# Patient Record
Sex: MALE | Race: WHITE | NOT HISPANIC OR LATINO | Employment: OTHER | URBAN - METROPOLITAN AREA
[De-identification: names, ages, dates, MRNs, and addresses within clinical notes are randomized per-mention and may not be internally consistent; named-entity substitution may affect disease eponyms.]

---

## 2023-05-18 ENCOUNTER — APPOINTMENT (OUTPATIENT)
Dept: LAB | Facility: LAB | Age: 78
End: 2023-05-18

## 2023-05-18 LAB
ALANINE AMINOTRANSFERASE (SGPT) (U/L) IN SER/PLAS: 16 U/L (ref 10–52)
ALBUMIN (G/DL) IN SER/PLAS: 4.3 G/DL (ref 3.4–5)
ALKALINE PHOSPHATASE (U/L) IN SER/PLAS: 91 U/L (ref 33–136)
ANION GAP IN SER/PLAS: 13 MMOL/L (ref 10–20)
ASPARTATE AMINOTRANSFERASE (SGOT) (U/L) IN SER/PLAS: 17 U/L (ref 9–39)
BASOPHILS (10*3/UL) IN BLOOD BY AUTOMATED COUNT: 0.04 X10E9/L (ref 0–0.1)
BASOPHILS/100 LEUKOCYTES IN BLOOD BY AUTOMATED COUNT: 0.6 % (ref 0–2)
BILIRUBIN TOTAL (MG/DL) IN SER/PLAS: 0.3 MG/DL (ref 0–1.2)
C REACTIVE PROTEIN (MG/L) IN SER/PLAS: 0.49 MG/DL
CALCIUM (MG/DL) IN SER/PLAS: 9.7 MG/DL (ref 8.6–10.6)
CARBON DIOXIDE, TOTAL (MMOL/L) IN SER/PLAS: 29 MMOL/L (ref 21–32)
CHLORIDE (MMOL/L) IN SER/PLAS: 106 MMOL/L (ref 98–107)
CREATININE (MG/DL) IN SER/PLAS: 0.86 MG/DL (ref 0.5–1.3)
EOSINOPHILS (10*3/UL) IN BLOOD BY AUTOMATED COUNT: 0.11 X10E9/L (ref 0–0.4)
EOSINOPHILS/100 LEUKOCYTES IN BLOOD BY AUTOMATED COUNT: 1.6 % (ref 0–6)
ERYTHROCYTE DISTRIBUTION WIDTH (RATIO) BY AUTOMATED COUNT: 15 % (ref 11.5–14.5)
ERYTHROCYTE MEAN CORPUSCULAR HEMOGLOBIN CONCENTRATION (G/DL) BY AUTOMATED: 31.3 G/DL (ref 32–36)
ERYTHROCYTE MEAN CORPUSCULAR VOLUME (FL) BY AUTOMATED COUNT: 86 FL (ref 80–100)
ERYTHROCYTES (10*6/UL) IN BLOOD BY AUTOMATED COUNT: 4.52 X10E12/L (ref 4.5–5.9)
GFR MALE: 88 ML/MIN/1.73M2
GLUCOSE (MG/DL) IN SER/PLAS: 89 MG/DL (ref 74–99)
HEMATOCRIT (%) IN BLOOD BY AUTOMATED COUNT: 39 % (ref 41–52)
HEMOGLOBIN (G/DL) IN BLOOD: 12.2 G/DL (ref 13.5–17.5)
IMMATURE GRANULOCYTES/100 LEUKOCYTES IN BLOOD BY AUTOMATED COUNT: 0.1 % (ref 0–0.9)
LEUKOCYTES (10*3/UL) IN BLOOD BY AUTOMATED COUNT: 6.9 X10E9/L (ref 4.4–11.3)
LYMPHOCYTES (10*3/UL) IN BLOOD BY AUTOMATED COUNT: 3.68 X10E9/L (ref 0.8–3)
LYMPHOCYTES/100 LEUKOCYTES IN BLOOD BY AUTOMATED COUNT: 53.1 % (ref 13–44)
MONOCYTES (10*3/UL) IN BLOOD BY AUTOMATED COUNT: 0.52 X10E9/L (ref 0.05–0.8)
MONOCYTES/100 LEUKOCYTES IN BLOOD BY AUTOMATED COUNT: 7.5 % (ref 2–10)
NEUTROPHILS (10*3/UL) IN BLOOD BY AUTOMATED COUNT: 2.57 X10E9/L (ref 1.6–5.5)
NEUTROPHILS/100 LEUKOCYTES IN BLOOD BY AUTOMATED COUNT: 37.1 % (ref 40–80)
NRBC (PER 100 WBCS) BY AUTOMATED COUNT: 0 /100 WBC (ref 0–0)
PLATELETS (10*3/UL) IN BLOOD AUTOMATED COUNT: 237 X10E9/L (ref 150–450)
POTASSIUM (MMOL/L) IN SER/PLAS: 5 MMOL/L (ref 3.5–5.3)
PROTEIN TOTAL: 6.5 G/DL (ref 6.4–8.2)
SEDIMENTATION RATE, ERYTHROCYTE: 16 MM/H (ref 0–20)
SODIUM (MMOL/L) IN SER/PLAS: 143 MMOL/L (ref 136–145)
UREA NITROGEN (MG/DL) IN SER/PLAS: 21 MG/DL (ref 6–23)

## 2023-11-20 ENCOUNTER — APPOINTMENT (OUTPATIENT)
Dept: OTOLARYNGOLOGY | Facility: CLINIC | Age: 78
End: 2023-11-20
Payer: MEDICARE

## 2023-11-27 ENCOUNTER — ANESTHESIA EVENT (OUTPATIENT)
Dept: OPERATING ROOM | Age: 78
End: 2023-11-27
Payer: MEDICARE

## 2023-11-27 ENCOUNTER — HOSPITAL ENCOUNTER (OUTPATIENT)
Age: 78
Setting detail: OUTPATIENT SURGERY
Discharge: HOME | End: 2023-11-27
Attending: UROLOGY | Admitting: UROLOGY
Payer: MEDICARE

## 2023-11-27 ENCOUNTER — ANESTHESIA (OUTPATIENT)
Dept: OPERATING ROOM | Age: 78
End: 2023-11-27
Payer: MEDICARE

## 2023-11-27 VITALS
HEIGHT: 64 IN | TEMPERATURE: 97.7 F | DIASTOLIC BLOOD PRESSURE: 61 MMHG | RESPIRATION RATE: 13 BRPM | BODY MASS INDEX: 33.63 KG/M2 | OXYGEN SATURATION: 98 % | HEART RATE: 86 BPM | SYSTOLIC BLOOD PRESSURE: 114 MMHG | WEIGHT: 197 LBS

## 2023-11-27 DIAGNOSIS — G89.18 POST-OPERATIVE PAIN: Primary | ICD-10-CM

## 2023-11-27 PROCEDURE — 3700000001 HC ADD 15 MINUTES (ANESTHESIA): Performed by: UROLOGY

## 2023-11-27 PROCEDURE — 2500000003 HC RX 250 WO HCPCS: Performed by: UROLOGY

## 2023-11-27 PROCEDURE — 7100000011 HC PHASE II RECOVERY - ADDTL 15 MIN: Performed by: UROLOGY

## 2023-11-27 PROCEDURE — 6360000002 HC RX W HCPCS: Performed by: ANESTHESIOLOGY

## 2023-11-27 PROCEDURE — 3700000000 HC ANESTHESIA ATTENDED CARE: Performed by: UROLOGY

## 2023-11-27 PROCEDURE — 2709999900 HC NON-CHARGEABLE SUPPLY: Performed by: UROLOGY

## 2023-11-27 PROCEDURE — 6360000002 HC RX W HCPCS: Performed by: NURSE ANESTHETIST, CERTIFIED REGISTERED

## 2023-11-27 PROCEDURE — 6370000000 HC RX 637 (ALT 250 FOR IP): Performed by: ANESTHESIOLOGY

## 2023-11-27 PROCEDURE — C1713 ANCHOR/SCREW BN/BN,TIS/BN: HCPCS | Performed by: UROLOGY

## 2023-11-27 PROCEDURE — 6360000002 HC RX W HCPCS: Performed by: UROLOGY

## 2023-11-27 PROCEDURE — 7100000001 HC PACU RECOVERY - ADDTL 15 MIN: Performed by: UROLOGY

## 2023-11-27 PROCEDURE — 3600000012 HC SURGERY LEVEL 2 ADDTL 15MIN: Performed by: UROLOGY

## 2023-11-27 PROCEDURE — 3600000002 HC SURGERY LEVEL 2 BASE: Performed by: UROLOGY

## 2023-11-27 PROCEDURE — 7100000010 HC PHASE II RECOVERY - FIRST 15 MIN: Performed by: UROLOGY

## 2023-11-27 PROCEDURE — 2500000003 HC RX 250 WO HCPCS: Performed by: NURSE ANESTHETIST, CERTIFIED REGISTERED

## 2023-11-27 PROCEDURE — 2580000003 HC RX 258: Performed by: ANESTHESIOLOGY

## 2023-11-27 PROCEDURE — 7100000000 HC PACU RECOVERY - FIRST 15 MIN: Performed by: UROLOGY

## 2023-11-27 RX ORDER — ST. JOHN'S WORT 300 MG
1 CAPSULE ORAL DAILY
COMMUNITY

## 2023-11-27 RX ORDER — FERROUS SULFATE 325(65) MG
325 TABLET ORAL DAILY
COMMUNITY

## 2023-11-27 RX ORDER — SUCCINYLCHOLINE/SOD CL,ISO/PF 100 MG/5ML
SYRINGE (ML) INTRAVENOUS PRN
Status: DISCONTINUED | OUTPATIENT
Start: 2023-11-27 | End: 2023-11-27 | Stop reason: SDUPTHER

## 2023-11-27 RX ORDER — FLUCONAZOLE 200 MG/1
200 TABLET ORAL DAILY
COMMUNITY
Start: 2023-06-02

## 2023-11-27 RX ORDER — TAMSULOSIN HYDROCHLORIDE 0.4 MG/1
0.4 CAPSULE ORAL 2 TIMES DAILY
COMMUNITY
Start: 2021-10-28

## 2023-11-27 RX ORDER — SODIUM CHLORIDE 0.9 % (FLUSH) 0.9 %
5-40 SYRINGE (ML) INJECTION EVERY 12 HOURS SCHEDULED
Status: DISCONTINUED | OUTPATIENT
Start: 2023-11-27 | End: 2023-11-27 | Stop reason: HOSPADM

## 2023-11-27 RX ORDER — HYDROMORPHONE HYDROCHLORIDE 1 MG/ML
0.5 INJECTION, SOLUTION INTRAMUSCULAR; INTRAVENOUS; SUBCUTANEOUS EVERY 5 MIN PRN
Status: DISCONTINUED | OUTPATIENT
Start: 2023-11-27 | End: 2023-11-27 | Stop reason: HOSPADM

## 2023-11-27 RX ORDER — PHENYLEPHRINE HCL IN 0.9% NACL 1 MG/10 ML
SYRINGE (ML) INTRAVENOUS PRN
Status: DISCONTINUED | OUTPATIENT
Start: 2023-11-27 | End: 2023-11-27 | Stop reason: SDUPTHER

## 2023-11-27 RX ORDER — DEXAMETHASONE SODIUM PHOSPHATE 10 MG/ML
INJECTION, SOLUTION INTRAMUSCULAR; INTRAVENOUS PRN
Status: DISCONTINUED | OUTPATIENT
Start: 2023-11-27 | End: 2023-11-27 | Stop reason: SDUPTHER

## 2023-11-27 RX ORDER — ROCURONIUM BROMIDE 10 MG/ML
INJECTION, SOLUTION INTRAVENOUS PRN
Status: DISCONTINUED | OUTPATIENT
Start: 2023-11-27 | End: 2023-11-27 | Stop reason: SDUPTHER

## 2023-11-27 RX ORDER — HYDROCODONE BITARTRATE AND ACETAMINOPHEN 5; 325 MG/1; MG/1
1 TABLET ORAL EVERY 6 HOURS PRN
Qty: 20 TABLET | Refills: 0 | Status: SHIPPED | OUTPATIENT
Start: 2023-11-27 | End: 2023-12-04

## 2023-11-27 RX ORDER — SODIUM CHLORIDE 9 MG/ML
INJECTION, SOLUTION INTRAVENOUS PRN
Status: DISCONTINUED | OUTPATIENT
Start: 2023-11-27 | End: 2023-11-27 | Stop reason: HOSPADM

## 2023-11-27 RX ORDER — FENTANYL CITRATE 50 UG/ML
25 INJECTION, SOLUTION INTRAMUSCULAR; INTRAVENOUS EVERY 5 MIN PRN
Status: DISCONTINUED | OUTPATIENT
Start: 2023-11-27 | End: 2023-11-27 | Stop reason: HOSPADM

## 2023-11-27 RX ORDER — DOXYCYCLINE HYCLATE 100 MG
100 TABLET ORAL 2 TIMES DAILY
Qty: 60 TABLET | Refills: 0 | Status: SHIPPED | OUTPATIENT
Start: 2023-11-27 | End: 2023-12-27

## 2023-11-27 RX ORDER — LIDOCAINE HYDROCHLORIDE 20 MG/ML
INJECTION, SOLUTION EPIDURAL; INFILTRATION; INTRACAUDAL; PERINEURAL PRN
Status: DISCONTINUED | OUTPATIENT
Start: 2023-11-27 | End: 2023-11-27 | Stop reason: SDUPTHER

## 2023-11-27 RX ORDER — ONDANSETRON 2 MG/ML
INJECTION INTRAMUSCULAR; INTRAVENOUS PRN
Status: DISCONTINUED | OUTPATIENT
Start: 2023-11-27 | End: 2023-11-27 | Stop reason: SDUPTHER

## 2023-11-27 RX ORDER — GLYCOPYRROLATE 0.2 MG/ML
INJECTION INTRAMUSCULAR; INTRAVENOUS PRN
Status: DISCONTINUED | OUTPATIENT
Start: 2023-11-27 | End: 2023-11-27 | Stop reason: SDUPTHER

## 2023-11-27 RX ORDER — LIDOCAINE HYDROCHLORIDE 10 MG/ML
1 INJECTION, SOLUTION EPIDURAL; INFILTRATION; INTRACAUDAL; PERINEURAL
Status: DISCONTINUED | OUTPATIENT
Start: 2023-11-27 | End: 2023-11-27 | Stop reason: HOSPADM

## 2023-11-27 RX ORDER — SODIUM CHLORIDE 0.9 % (FLUSH) 0.9 %
5-40 SYRINGE (ML) INJECTION PRN
Status: DISCONTINUED | OUTPATIENT
Start: 2023-11-27 | End: 2023-11-27 | Stop reason: HOSPADM

## 2023-11-27 RX ORDER — RIVAROXABAN 20 MG/1
20 TABLET, FILM COATED ORAL
COMMUNITY
Start: 2021-10-25

## 2023-11-27 RX ORDER — SODIUM CHLORIDE 9 MG/ML
INJECTION, SOLUTION INTRAVENOUS CONTINUOUS
Status: DISCONTINUED | OUTPATIENT
Start: 2023-11-27 | End: 2023-11-27 | Stop reason: HOSPADM

## 2023-11-27 RX ORDER — DONEPEZIL HYDROCHLORIDE 5 MG/1
5 TABLET, FILM COATED ORAL NIGHTLY
COMMUNITY
Start: 2023-08-28

## 2023-11-27 RX ORDER — POTASSIUM CHLORIDE 750 MG/1
20 CAPSULE, EXTENDED RELEASE ORAL DAILY
COMMUNITY
Start: 2023-06-01

## 2023-11-27 RX ORDER — DOXYCYCLINE 100 MG/1
100 CAPSULE ORAL EVERY 12 HOURS
COMMUNITY
Start: 2023-06-02

## 2023-11-27 RX ORDER — SODIUM CHLORIDE, SODIUM LACTATE, POTASSIUM CHLORIDE, CALCIUM CHLORIDE 600; 310; 30; 20 MG/100ML; MG/100ML; MG/100ML; MG/100ML
INJECTION, SOLUTION INTRAVENOUS CONTINUOUS
Status: DISCONTINUED | OUTPATIENT
Start: 2023-11-27 | End: 2023-11-27 | Stop reason: HOSPADM

## 2023-11-27 RX ORDER — MEMANTINE HYDROCHLORIDE 28 MG/1
28 CAPSULE, EXTENDED RELEASE ORAL DAILY
COMMUNITY
Start: 2019-09-17

## 2023-11-27 RX ORDER — ONDANSETRON 2 MG/ML
4 INJECTION INTRAMUSCULAR; INTRAVENOUS
Status: DISCONTINUED | OUTPATIENT
Start: 2023-11-27 | End: 2023-11-27 | Stop reason: HOSPADM

## 2023-11-27 RX ORDER — HYDROCODONE BITARTRATE AND ACETAMINOPHEN 5; 325 MG/1; MG/1
1 TABLET ORAL ONCE
Status: COMPLETED | OUTPATIENT
Start: 2023-11-27 | End: 2023-11-27

## 2023-11-27 RX ORDER — EPHEDRINE SULFATE/0.9% NACL/PF 50 MG/5 ML
SYRINGE (ML) INTRAVENOUS PRN
Status: DISCONTINUED | OUTPATIENT
Start: 2023-11-27 | End: 2023-11-27 | Stop reason: SDUPTHER

## 2023-11-27 RX ORDER — PROPOFOL 10 MG/ML
INJECTION, EMULSION INTRAVENOUS PRN
Status: DISCONTINUED | OUTPATIENT
Start: 2023-11-27 | End: 2023-11-27 | Stop reason: SDUPTHER

## 2023-11-27 RX ORDER — FENTANYL CITRATE 50 UG/ML
INJECTION, SOLUTION INTRAMUSCULAR; INTRAVENOUS PRN
Status: DISCONTINUED | OUTPATIENT
Start: 2023-11-27 | End: 2023-11-27 | Stop reason: SDUPTHER

## 2023-11-27 RX ADMIN — Medication 100 MCG: at 12:15

## 2023-11-27 RX ADMIN — Medication 10 MG: at 12:47

## 2023-11-27 RX ADMIN — HYDROMORPHONE HYDROCHLORIDE 0.5 MG: 1 INJECTION, SOLUTION INTRAMUSCULAR; INTRAVENOUS; SUBCUTANEOUS at 14:38

## 2023-11-27 RX ADMIN — SODIUM CHLORIDE, POTASSIUM CHLORIDE, SODIUM LACTATE AND CALCIUM CHLORIDE: 600; 310; 30; 20 INJECTION, SOLUTION INTRAVENOUS at 13:19

## 2023-11-27 RX ADMIN — ROCURONIUM BROMIDE 30 MG: 10 INJECTION, SOLUTION INTRAVENOUS at 12:25

## 2023-11-27 RX ADMIN — Medication 10 MG: at 12:17

## 2023-11-27 RX ADMIN — ONDANSETRON 4 MG: 2 INJECTION INTRAMUSCULAR; INTRAVENOUS at 12:14

## 2023-11-27 RX ADMIN — Medication 100 MCG: at 12:49

## 2023-11-27 RX ADMIN — Medication 100 MCG: at 12:22

## 2023-11-27 RX ADMIN — Medication 100 MCG: at 12:43

## 2023-11-27 RX ADMIN — SUGAMMADEX 179 MG: 100 INJECTION, SOLUTION INTRAVENOUS at 13:15

## 2023-11-27 RX ADMIN — Medication 100 MCG: at 13:13

## 2023-11-27 RX ADMIN — PROPOFOL 120 MG: 10 INJECTION, EMULSION INTRAVENOUS at 12:02

## 2023-11-27 RX ADMIN — HYDROMORPHONE HYDROCHLORIDE 0.5 MG: 1 INJECTION, SOLUTION INTRAMUSCULAR; INTRAVENOUS; SUBCUTANEOUS at 14:45

## 2023-11-27 RX ADMIN — HYDROCODONE BITARTRATE AND ACETAMINOPHEN 1 TABLET: 5; 325 TABLET ORAL at 15:27

## 2023-11-27 RX ADMIN — Medication 10 MG: at 12:24

## 2023-11-27 RX ADMIN — ROCURONIUM BROMIDE 10 MG: 10 INJECTION, SOLUTION INTRAVENOUS at 12:02

## 2023-11-27 RX ADMIN — Medication 2000 MG: at 12:13

## 2023-11-27 RX ADMIN — FENTANYL CITRATE 50 MCG: 50 INJECTION INTRAMUSCULAR; INTRAVENOUS at 12:54

## 2023-11-27 RX ADMIN — DEXAMETHASONE SODIUM PHOSPHATE 10 MG: 10 INJECTION, SOLUTION INTRAMUSCULAR; INTRAVENOUS at 12:13

## 2023-11-27 RX ADMIN — GLYCOPYRROLATE 0.2 MG: 0.2 INJECTION INTRAMUSCULAR; INTRAVENOUS at 13:03

## 2023-11-27 RX ADMIN — Medication 100 MCG: at 13:03

## 2023-11-27 RX ADMIN — Medication 100 MG: at 12:03

## 2023-11-27 RX ADMIN — FENTANYL CITRATE 100 MCG: 50 INJECTION INTRAMUSCULAR; INTRAVENOUS at 12:02

## 2023-11-27 RX ADMIN — SODIUM CHLORIDE, POTASSIUM CHLORIDE, SODIUM LACTATE AND CALCIUM CHLORIDE: 600; 310; 30; 20 INJECTION, SOLUTION INTRAVENOUS at 09:43

## 2023-11-27 RX ADMIN — LIDOCAINE HYDROCHLORIDE 80 MG: 20 INJECTION, SOLUTION EPIDURAL; INFILTRATION; INTRACAUDAL; PERINEURAL at 12:02

## 2023-11-27 ASSESSMENT — PAIN DESCRIPTION - LOCATION
LOCATION: PENIS

## 2023-11-27 ASSESSMENT — PAIN SCALES - GENERAL
PAINLEVEL_OUTOF10: 4
PAINLEVEL_OUTOF10: 4
PAINLEVEL_OUTOF10: 8
PAINLEVEL_OUTOF10: 8

## 2023-11-27 ASSESSMENT — PAIN DESCRIPTION - DESCRIPTORS
DESCRIPTORS: SHARP;SORE

## 2023-11-27 ASSESSMENT — PAIN - FUNCTIONAL ASSESSMENT
PAIN_FUNCTIONAL_ASSESSMENT: 0-10
PAIN_FUNCTIONAL_ASSESSMENT: 0-10

## 2023-11-27 ASSESSMENT — PAIN DESCRIPTION - FREQUENCY
FREQUENCY: CONTINUOUS

## 2023-11-27 ASSESSMENT — PAIN DESCRIPTION - PAIN TYPE
TYPE: SURGICAL PAIN

## 2023-11-27 NOTE — ANESTHESIA POSTPROCEDURE EVALUATION
Department of Anesthesiology  Postprocedure Note    Patient: Becky Harmon  MRN: [id number]  [address] [facility] [place]: 1945  Date of evaluation: 11/27/2023      Procedure Summary     Date: 11/27/23 Room / Location: [address] - INPATIENT    Anesthesia Start: 5683 Anesthesia Stop: 3136    Procedure: BUCCAL URETHROPLASTY WITH HYPOSPADIAS REPAIR AND URETHRAL STENT PLACEMENT Diagnosis:       Stricture of urethral meatus in male, unspecified stricture type      (Stricture of urethral meatus in male, unspecified stricture type [N35.911])    Surgeons: Ifrah Ha MD Responsible Provider: Erasmo Mark MD    Anesthesia Type: general ASA Status: 3          Anesthesia Type: No value filed.     Chon Phase I: Chon Score: 8    Chon Phase II: Chon Score: 10      Anesthesia Post Evaluation    Patient location during evaluation: PACU  Patient participation: complete - patient participated  Level of consciousness: awake and alert  Airway patency: patent  Nausea & Vomiting: no nausea and no vomiting  Complications: no  Cardiovascular status: hemodynamically stable  Respiratory status: acceptable  Hydration status: euvolemic  Pain management: adequate

## 2023-11-27 NOTE — H&P
History and Physical Service   [address] [place] [address]     HISTORY AND PHYSICAL EXAMINATION            Date of Evaluation: 11/27/2023  Patient name:  Yolette Love  MRN:   [id number]  YOB: 1945  PCP:    Elio Escalera DO    History Obtained From:     Patient, medical records    History of Present Illness: This is Yolette Love a 78 y.o. male who presents today for a URETHROPLASTY WITH HYPOSPADIAS REPAIR AND URETHRAL STENT PLACEMENT by Anali Amado MD for Stricture of urethral meatus in male, unspecified stricture type. THE PATIENT HAS HAD PROBLEMS WITH URINATION FOR YEARS. HE IS UNABLE TO URINATE WHILE STANDING. HE HAS HAD MULTIPLE URETHRAL DILATIONS WITHOUT MUCH SUCCESS. HE NOW PRESENTS FOR SURGICAL CORRECTION. Denies fever, chills, shortness of breath, cough, congestion, wheezing, chest pain, open sores or wounds. HE TAKES XARELTO FOR HISTORY OF DVT. HE STOPPED 11/23/2023. HE DOES NOT HAVE DIABETES.     Past Medical History:     Past Medical History:   Diagnosis Date    Acute carpal tunnel syndrome     Arthritis     Asthma     AS A CHILD    H/O oral cancer 2012    with palate rand right jaw reconstruction, had chemo and radiation    Hx of blood clots     Hypospadias     Memory deficit     Multiple myeloma ([address])     Nose fracture 11/2023    from fall    Psoriasis (a type of skin inflammation)     Rib fractures 11/2023    left side from fall 1st-3rd    Skin cancer     squamos cell carcinoma    Sleep apnea     cant use bipap due to oral surgery    Walker as ambulation aid         Past Surgical History:     Past Surgical History:   Procedure Laterality Date    BONE GRAFT Bilateral     bone removed from bilat arms to reconstruct palate and jaw    CARPAL TUNNEL RELEASE Right     CHORDEE RELEASE      hypospadias repair w./ cystoscopy    CYSTOSCOPY      01/20223 And 06/2023    FRACTURE SURGERY Right     JAW    JOINT REPLACEMENT Right 2010    knee    MANDIBLE RECONSTRUCTION Right     jaw

## 2023-11-27 NOTE — DISCHARGE INSTRUCTIONS
Regular diet  Resume home meds  No activity restrictions. Ok to resume showering starting tomorrow. Gently pat area dry. No scrubbing of the area. Please be sure to place neosporin on the incision 2x daily for 14 days.    RESUME BLOOD THINNERS STARTING ON THURSDAY

## 2023-11-28 PROBLEM — M87.9: Status: ACTIVE | Noted: 2023-11-28

## 2023-11-28 PROBLEM — H91.90 HEARING LOSS: Status: ACTIVE | Noted: 2023-11-28

## 2023-11-28 PROBLEM — S02.609A MANDIBLE FRACTURE (MULTI): Status: ACTIVE | Noted: 2023-11-28

## 2023-11-28 PROBLEM — C06.9 ORAL CANCER (MULTI): Status: ACTIVE | Noted: 2023-11-28

## 2023-11-28 PROBLEM — R49.21 HYPERNASALITY: Status: ACTIVE | Noted: 2023-11-28

## 2023-11-28 PROBLEM — T66.XXXA EFFECTS OF RADIATION: Status: ACTIVE | Noted: 2023-11-28

## 2023-11-28 RX ORDER — POTASSIUM CHLORIDE 750 MG/1
10 CAPSULE, EXTENDED RELEASE ORAL
COMMUNITY
Start: 2021-09-20

## 2023-11-28 RX ORDER — BIOTIN 1 MG
1000 TABLET ORAL DAILY
COMMUNITY

## 2023-11-28 RX ORDER — RIVAROXABAN 20 MG/1
20 TABLET, FILM COATED ORAL DAILY
COMMUNITY
Start: 2021-07-08

## 2023-11-28 RX ORDER — DOXYCYCLINE HYCLATE 100 MG
100 TABLET ORAL
COMMUNITY
Start: 2022-12-22 | End: 2023-11-30 | Stop reason: SDUPTHER

## 2023-11-28 RX ORDER — MEMANTINE HYDROCHLORIDE 28 MG/1
28 CAPSULE, EXTENDED RELEASE ORAL DAILY
COMMUNITY
Start: 2016-09-14

## 2023-11-28 RX ORDER — PERPHENAZINE 16 MG
1 TABLET ORAL DAILY
COMMUNITY

## 2023-11-28 RX ORDER — FLUCONAZOLE 200 MG/1
200 TABLET ORAL DAILY
COMMUNITY
Start: 2022-07-28 | End: 2023-11-30 | Stop reason: SDUPTHER

## 2023-11-29 NOTE — OP NOTE
Dr. Ade Kaur MD  Urologic Surgery      [address], [place]. [facility]  11/29/23    Patient:  Severino Rodriguez  MRN: [id number]  YOB: 1945    Surgeon: Dr. Ade Kaur MD  Assistant: None    Pre-op Diagnosis: Hypospadias. Urethral meatal stricture. Post-op Diagnosis: Same    Procedure:   1 stage buccal mucosa urethroplasty. Hypospadias repair. Mobilization and rotation of local flaps. Full-thickness graft harvest from inner cheek for autotransplantation. Anesthesia: General  Complications: None  OR Blood Loss:  Minimal  Fluids: Cystalloids  Specimens:  * No specimens in log *    Indication:  78-year-old male with hypospadias and severe meatal narrowing. Patient has a history of urethroplasty with inner thigh tissue in the past.  These repairs were when he was a child and they broke down. He presents today for correction of his hypospadias with buccal inlay and correction of urethral stricture. Narrative of the Procedure:    After informed consent was obtained in the preoperative area the patient was taken back to the operating room and transferred to the operative table in supine position. Anesthesia was induced. Antibiotics were given. He remained in the supine position. He was sterilely prepped and draped in a standard fashion both in his mouth as well as in the genital areas. At this time I harvested a 4 x 2 cm area of graft from his left inner cheek. Stensen's duct was marked and we stayed clear this area. The graft was elevated using a traction stitch and then removed from the underlying muscle. Bipolar was used to control small oozing vessels and then I reapproximated the graft harvest site using running 3-0 chromic stitch. No bleeding was noted once completed. I then defatted the graft. Was taken down and placed in normal saline. I then evaluated the patient's pendulous and distal urethra.   The patient had hypospadias down to the level of the coronal sulcus

## 2023-11-30 ENCOUNTER — OFFICE VISIT (OUTPATIENT)
Dept: INFECTIOUS DISEASES | Facility: CLINIC | Age: 78
End: 2023-11-30
Payer: MEDICARE

## 2023-11-30 VITALS
WEIGHT: 201 LBS | TEMPERATURE: 98.5 F | BODY MASS INDEX: 39.46 KG/M2 | SYSTOLIC BLOOD PRESSURE: 133 MMHG | HEIGHT: 60 IN | HEART RATE: 90 BPM | DIASTOLIC BLOOD PRESSURE: 85 MMHG

## 2023-11-30 DIAGNOSIS — M27.2 OSTEOMYELITIS OF MANDIBLE: ICD-10-CM

## 2023-11-30 DIAGNOSIS — A42.9 ACTINOMYCOSIS: Primary | ICD-10-CM

## 2023-11-30 PROCEDURE — 1036F TOBACCO NON-USER: CPT | Performed by: INTERNAL MEDICINE

## 2023-11-30 PROCEDURE — 99214 OFFICE O/P EST MOD 30 MIN: CPT | Performed by: INTERNAL MEDICINE

## 2023-11-30 PROCEDURE — 1126F AMNT PAIN NOTED NONE PRSNT: CPT | Performed by: INTERNAL MEDICINE

## 2023-11-30 RX ORDER — DOXYCYCLINE HYCLATE 100 MG
100 TABLET ORAL 2 TIMES DAILY
Qty: 180 TABLET | Refills: 1 | Status: SHIPPED | OUTPATIENT
Start: 2023-11-30 | End: 2024-02-28

## 2023-11-30 RX ORDER — FLUCONAZOLE 200 MG/1
200 TABLET ORAL DAILY
Qty: 90 TABLET | Refills: 1 | Status: SHIPPED | OUTPATIENT
Start: 2023-11-30 | End: 2024-06-03

## 2023-11-30 ASSESSMENT — ENCOUNTER SYMPTOMS
MUSCULOSKELETAL NEGATIVE: 1
HEMATOLOGIC/LYMPHATIC NEGATIVE: 1
RESPIRATORY NEGATIVE: 1
GASTROINTESTINAL NEGATIVE: 1
CARDIOVASCULAR NEGATIVE: 1
NEUROLOGICAL NEGATIVE: 1
PSYCHIATRIC NEGATIVE: 1
EYES NEGATIVE: 1
ALLERGIC/IMMUNOLOGIC NEGATIVE: 1
CONSTITUTIONAL NEGATIVE: 1

## 2023-11-30 ASSESSMENT — PAIN SCALES - GENERAL: PAINLEVEL: 0-NO PAIN

## 2023-11-30 NOTE — LETTER
11/30/23    Nawaf Miranda DO  [address]  [facility]  [facility] [address]      Dear Dr. Nawaf Miranda DO,    I am writing to confirm that your patient, Giovanny Kendrick, received care in my office on 11/30/23. I have enclosed a summary of the care provided to Giovanny for your reference.    Please contact me with any questions you may have regarding the visit.    Sincerely,         Darrion Obando MD MPH  [address] [facility]  [facility] [address]  [facility] [address]    CC: No Recipients

## 2023-11-30 NOTE — PROGRESS NOTES
Infectious Diseases Clinic Follow-up:    Reason for Visit:   Chief Complaint   Patient presents with    Follow-up     History of Current Issue  Giovanny Kendrick is a 78 y.o. year old male     Here for hospital follow up with wife.     I first saw pt in 6/2022 for OM of mandible, full details as below.     EXCERPTS FROM LAST HOSPITAL ID NOTE     Mr. Kendrick is a 77 y/o/M with a PMH notable for ORN, R. mandible SCC resection + radiation in 2012, and debridement in April 2022 s/p R. mandible reconstruction with titanium hardware and R. forearm osteocutaneous graft and s/p open reduction and internal fixation of R. 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 mandible and hardware cultured on 5/31. Pt is HDS, afebrile, and his leukocytosis has resolved     Micro:  -actinomyces odontolyticus R. mandible on 5/25; sensitive to CTX,clinda, pencillin, and vanc; sensitive to tetracyclines  - +3 aerobes on 5/25 and 5/31 in R. mandible   - C. albicans on 5/31 in R. mandible and hardware (pan-sensitive)  -Blood Cx NGTD 6/1     Plan:  -c/w IV unasyn 3g q6h; stop date 7/15/22  -c/w IV fluconazole 400g q24hrs ; stop date 7/15/22  -Weekly CMP, CBC + diff, ESR, CRP; fax to [phone], Attn: Dr. Darrion Obando  -Schedule follow-up appointment with Dr. Darrion Obando  -will transition to appropriate oral treatment for actinomyces upon completion of IV treatment  -will need long term suppressive therapy for candida d/t hardware        PAST MEDICAL HISTORY:  Past Medical History:   Diagnosis Date    Personal history of malignant neoplasm of other organs and systems     History of squamous cell carcinoma    Personal history of malignant neoplasm, unspecified 01/30/2015    History of malignant neoplasm    Personal history of other diseases of the nervous system and sense organs     History of sleep apnea    Personal history of other  diseases of the respiratory system     Personal history of asthma    Snoring     Snoring    Squamous cell carcinoma of skin of scalp and neck 01/30/2015    SCC (squamous cell carcinoma), scalp/neck       PAST SURGICAL HISTORY:  Past Surgical History:   Procedure Laterality Date    APPENDECTOMY  10/28/2013    Appendectomy    KNEE SURGERY  10/28/2013    Knee Surgery Right    TONSILLECTOMY  10/28/2013    Tonsillectomy       ALLERGIES:    Not on File    MEDICATIONS:      Current Outpatient Medications:     alpha lipoic acid 600 mg capsule, Take 1 capsule by mouth once daily., Disp: , Rfl:     biotin 1 mg tablet, Take 1 tablet (1,000 mcg) by mouth once daily., Disp: , Rfl:     doxycycline (Vibra-Tabs) 100 mg tablet, Take 1 tablet (100 mg) by mouth., Disp: , Rfl:     fluconazole (Diflucan) 200 mg tablet, Take 1 tablet (200 mg) by mouth once daily., Disp: , Rfl:     Namenda XR 28 mg capsule,sprinkle,ER 24hr, Take 1 capsule (28 mg) by mouth once daily., Disp: , Rfl:     potassium chloride ER (Micro-K) 10 mEq ER capsule, Take 1 capsule (10 mEq) by mouth 2 times a day with meals., Disp: , Rfl:     Xarelto 20 mg tablet, Take 1 tablet (20 mg) by mouth once daily., Disp: , Rfl:     REVIEW OF SYSTEMS:  Review of Systems   Constitutional: Negative.    HENT: Negative.     Eyes: Negative.    Respiratory: Negative.     Cardiovascular: Negative.    Gastrointestinal: Negative.    Genitourinary: Negative.    Musculoskeletal: Negative.    Skin: Negative.    Allergic/Immunologic: Negative.    Neurological: Negative.    Hematological: Negative.    Psychiatric/Behavioral: Negative.     All other systems reviewed and are negative.      PHYSICAL EXAMINATION:     Visit Vitals  /85   Pulse 90   Temp 36.9 °C (98.5 °F)   Ht 1.524 m (5')   Wt 91.2 kg (201 lb)   BMI 39.26 kg/m²   Smoking Status Never   BSA 1.96 m²        EXAM:   Physical Exam  Vitals reviewed.   Constitutional:       Appearance: Normal appearance.   HENT:      Head:  Normocephalic.      Nose: Nose normal.   Eyes:      Extraocular Movements: Extraocular movements intact.      Pupils: Pupils are equal, round, and reactive to light.   Cardiovascular:      Rate and Rhythm: Normal rate and regular rhythm.   Pulmonary:      Effort: Pulmonary effort is normal.   Abdominal:      General: Abdomen is flat. Bowel sounds are normal.   Musculoskeletal:         General: Normal range of motion.      Cervical back: Normal range of motion.   Skin:     General: Skin is warm.   Neurological:      General: No focal deficit present.      Mental Status: He is alert.   Psychiatric:         Mood and Affect: Mood normal.         Behavior: Behavior normal.         Thought Content: Thought content normal.         Judgment: Judgment normal.        ASSESSMENT / RECOMMENDATIONS:  Mr. Kendrick is a 75 y/o/M with a PMH notable for ORN, R. mandible SCC resection + radiation in 2012, and debridement in April 2022 s/p R. mandible reconstruction with titanium hardware and R. forearm osteocutaneous graft and s/p open reduction and internal fixation of R. 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 mandible and hardware cultured on 5/31. Pt is HDS, afebrile, and his leukocytosis has resolved     MICRO:  -actinomyces odontolyticus R. mandible on 5/25; sensitive to CTX,clinda, pencillin, and vanc; sensitive to tetracyclines  - +3 aerobes on 5/25 and 5/31 in R. mandible   - C. albicans on 5/31 in R. mandible and hardware (pan-sensitive)  -Blood Cx NGTD 6/1     IMPRESSIONS:  1. Cervicofacial actinomycosis  2. Mandibular OM (actino + candidal)     CURRENT (11/30/2023):  Doing well on oral ABX  Plan is to continue ABXs indefinitely given underlying hardware and risk of recurrent infection     PLAN:  1.Continue doxycycline 100 mg PO BID  2. Continue fluconazole 200 mg PO daily  3. RTC in 6 mo to reassess     Pt and  wife are in agreement with plan, state that feel satisfied all their questions were adequately answered.    I spent 20 minutes in the professional and overall care of this patient.      Darrion Obando MD MPH

## 2023-12-04 ENCOUNTER — OFFICE VISIT (OUTPATIENT)
Dept: OTOLARYNGOLOGY | Facility: CLINIC | Age: 78
End: 2023-12-04
Payer: MEDICARE

## 2023-12-04 VITALS — SYSTOLIC BLOOD PRESSURE: 117 MMHG | DIASTOLIC BLOOD PRESSURE: 68 MMHG | TEMPERATURE: 97.8 F

## 2023-12-04 DIAGNOSIS — S02.601D CLOSED FRACTURE OF RIGHT SIDE OF MANDIBULAR BODY WITH ROUTINE HEALING, SUBSEQUENT ENCOUNTER: ICD-10-CM

## 2023-12-04 DIAGNOSIS — C06.9 ORAL CANCER (MULTI): Primary | ICD-10-CM

## 2023-12-04 PROCEDURE — 1036F TOBACCO NON-USER: CPT | Performed by: OTOLARYNGOLOGY

## 2023-12-04 PROCEDURE — 99213 OFFICE O/P EST LOW 20 MIN: CPT | Performed by: OTOLARYNGOLOGY

## 2023-12-04 PROCEDURE — 1159F MED LIST DOCD IN RCRD: CPT | Performed by: OTOLARYNGOLOGY

## 2023-12-04 PROCEDURE — 1160F RVW MEDS BY RX/DR IN RCRD: CPT | Performed by: OTOLARYNGOLOGY

## 2023-12-04 PROCEDURE — 1126F AMNT PAIN NOTED NONE PRSNT: CPT | Performed by: OTOLARYNGOLOGY

## 2023-12-05 NOTE — PROGRESS NOTES
ENT Follow up Visit    History Of Present Illness  Giovanny Kendrick is a 78 y.o. male presents for follow-up     2012 right maxillectomy and RFF for SCCa, completed XRT in 2012     TSH: with PCP  Chest Imagin2023 no progression of nodules     hx of ORN of the right maxilla and left mandible. Loose bone was found at right maxilla and left mandible both of which were easily closed. He then developed a right mandible fracture related to ORN and is now s/p right segmental mandibulectomy and flap reconstruction with a right ocrff.      2023: Patient recently met with infectious disease who recommended continued long-term antibiotic treatment.  He is going to get these filled long-term by his local oncologist.  He has remained asymptomatic and denies any pain.  He has had some voice changes but no issues with swallowing  Past Medical History  He has a past medical history of Personal history of malignant neoplasm of other organs and systems, Personal history of malignant neoplasm, unspecified (2015), Personal history of other diseases of the nervous system and sense organs, Personal history of other diseases of the respiratory system, Snoring, and Squamous cell carcinoma of skin of scalp and neck (2015).    Surgical History  He has a past surgical history that includes Tonsillectomy (10/28/2013); Appendectomy (10/28/2013); and Knee surgery (10/28/2013).     Social History  He reports that he has never smoked. He has never used smokeless tobacco. He reports that he does not drink alcohol and does not use drugs.    Family History  No family history on file.     Allergies  Patient has no known allergies.     Physical Exam:  Well appearing individual in no acute distress. No stertor and no Stridor. Good voice. No LAD. Skin is soft and supple. Oral cavity and oropharynx are without mucosal lesions or exposed bone. all surgical sites well healed.  Possible fracture line and the hardware no  thyromegaly. Anterior rhinoscopy reveals normal nasal mucosa bilaterally. EAC normal AU with KATHLEEN. Cranial Nerves grossly intact.     Procedure Note: Flexible Nasolaryngoscopy  Verbal informed consent was obtained from the patient/patient's guardian. 4% lidocaine mixed with phenylephrine was prepared and dripped into the nose. It was placed in the right naris. Following an appropriate amount of time to allow for adequate anesthesia, a flexible fiberoptic nasolaryngoscope was placed into the patient's right naris. The nasal cavity, nasopharynx, oropharynx, hypopharynx, and all endolaryngeal structures were visualized and were normal except as listed below. 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  No mucosal lesions or other concerns     Last Recorded Vitals  Blood pressure 117/68, temperature 36.6 °C (97.8 °F).      Assessment and Plan  78 y.o. male s/p 04/2012 RFFF after right maxillectomy for SCCa, SHEILA. Recently had debridement of right maxilla and left mandible due to ORN. Then had pathologic fracture of right mandible due to ORN and underwent mandibulectomy and flap recon       -CT scans will be requested  -Continue follow-up with infectious disease for antibiotic management  -Follow-up in 4-6 months, earlier with any issues    Silver Cunningham MD

## 2024-05-23 ENCOUNTER — APPOINTMENT (OUTPATIENT)
Dept: INFECTIOUS DISEASES | Facility: CLINIC | Age: 79
End: 2024-05-23
Payer: MEDICARE

## 2024-05-30 ENCOUNTER — APPOINTMENT (OUTPATIENT)
Dept: INFECTIOUS DISEASES | Facility: CLINIC | Age: 79
End: 2024-05-30
Payer: MEDICARE

## 2024-06-01 DIAGNOSIS — M27.2 OSTEOMYELITIS OF MANDIBLE: ICD-10-CM

## 2024-06-01 DIAGNOSIS — A42.9 ACTINOMYCOSIS: ICD-10-CM

## 2024-06-03 RX ORDER — FLUCONAZOLE 200 MG/1
200 TABLET ORAL DAILY
Qty: 90 TABLET | Refills: 1 | Status: SHIPPED | OUTPATIENT
Start: 2024-06-03

## 2024-06-19 ENCOUNTER — TELEPHONE (OUTPATIENT)
Dept: OTOLARYNGOLOGY | Facility: HOSPITAL | Age: 79
End: 2024-06-19
Payer: MEDICARE

## 2024-06-19 DIAGNOSIS — S02.601D CLOSED FRACTURE OF RIGHT SIDE OF MANDIBULAR BODY WITH ROUTINE HEALING, SUBSEQUENT ENCOUNTER: Primary | ICD-10-CM

## 2024-06-19 NOTE — TELEPHONE ENCOUNTER
I was contacted by Dr. Sandoval because the patient and his wife noticed a small firm nodular area on the left lateral aspect of the neck.  This is about the size of a BB.  It has not changed since they noticed that and is nontender.  Based on the operative report I suspect this is likely the  from his venous anastomosis.  Advised them to keep an eye on it for changes and also to schedule follow-up with me to check in person

## 2024-06-20 ENCOUNTER — APPOINTMENT (OUTPATIENT)
Dept: INFECTIOUS DISEASES | Facility: CLINIC | Age: 79
End: 2024-06-20
Payer: MEDICARE

## 2024-06-20 VITALS
HEIGHT: 60 IN | HEART RATE: 81 BPM | TEMPERATURE: 98.2 F | SYSTOLIC BLOOD PRESSURE: 105 MMHG | BODY MASS INDEX: 39.46 KG/M2 | DIASTOLIC BLOOD PRESSURE: 63 MMHG | WEIGHT: 201 LBS

## 2024-06-20 DIAGNOSIS — A42.9 ACTINOMYCOSIS: ICD-10-CM

## 2024-06-20 DIAGNOSIS — M27.2 OSTEOMYELITIS OF MANDIBLE: Primary | ICD-10-CM

## 2024-06-20 PROCEDURE — 1126F AMNT PAIN NOTED NONE PRSNT: CPT | Performed by: INTERNAL MEDICINE

## 2024-06-20 PROCEDURE — 1157F ADVNC CARE PLAN IN RCRD: CPT | Performed by: INTERNAL MEDICINE

## 2024-06-20 PROCEDURE — 99214 OFFICE O/P EST MOD 30 MIN: CPT | Performed by: INTERNAL MEDICINE

## 2024-06-20 PROCEDURE — 1159F MED LIST DOCD IN RCRD: CPT | Performed by: INTERNAL MEDICINE

## 2024-06-20 RX ORDER — TRIAMCINOLONE ACETONIDE 1 MG/G
CREAM TOPICAL 2 TIMES DAILY
COMMUNITY
Start: 2024-05-15

## 2024-06-20 RX ORDER — TAMSULOSIN HYDROCHLORIDE 0.4 MG/1
0.4 CAPSULE ORAL DAILY
COMMUNITY
Start: 2016-02-03

## 2024-06-20 RX ORDER — DONEPEZIL HYDROCHLORIDE 5 MG/1
5 TABLET, FILM COATED ORAL NIGHTLY
COMMUNITY

## 2024-06-20 RX ORDER — DOXYCYCLINE 100 MG/1
100 CAPSULE ORAL 2 TIMES DAILY
COMMUNITY
Start: 2024-06-19

## 2024-06-20 RX ORDER — FERROUS SULFATE 325(65) MG
325 TABLET ORAL DAILY
COMMUNITY
Start: 2024-05-15

## 2024-06-20 ASSESSMENT — ENCOUNTER SYMPTOMS
DEPRESSION: 0
LOSS OF SENSATION IN FEET: 0
GASTROINTESTINAL NEGATIVE: 1
OCCASIONAL FEELINGS OF UNSTEADINESS: 0
MUSCULOSKELETAL NEGATIVE: 1
ALLERGIC/IMMUNOLOGIC NEGATIVE: 1
PSYCHIATRIC NEGATIVE: 1
CARDIOVASCULAR NEGATIVE: 1
NEUROLOGICAL NEGATIVE: 1
RESPIRATORY NEGATIVE: 1
CONSTITUTIONAL NEGATIVE: 1
HEMATOLOGIC/LYMPHATIC NEGATIVE: 1
EYES NEGATIVE: 1

## 2024-06-20 ASSESSMENT — PAIN SCALES - GENERAL: PAINLEVEL: 0-NO PAIN

## 2024-06-20 NOTE — PROGRESS NOTES
Infectious Diseases Clinic Follow-up:    Reason for Visit:   Chief Complaint   Patient presents with    Follow-up     Follow up visit for actinomycosis.       History of Current Issue  Giovanny Kendrick is a 78 y.o. year old male      Here with wife for routine follow up.     I first saw pt in 6/2022 for OM of mandible, full details as below.     EXCERPTS FROM LAST HOSPITAL ID NOTE     Mr. Kendrick is a 77 y/o/M with a PMH notable for ORN, R. mandible SCC resection + radiation in 2012, and debridement in April 2022 s/p R. mandible reconstruction with titanium hardware and R. forearm osteocutaneous graft and s/p open reduction and internal fixation of R. 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 mandible and hardware cultured on 5/31. Pt is HDS, afebrile, and his leukocytosis has resolved     Micro:  -actinomyces odontolyticus R. mandible on 5/25; sensitive to CTX,clinda, pencillin, and vanc; sensitive to tetracyclines  - +3 aerobes on 5/25 and 5/31 in R. mandible   - C. albicans on 5/31 in R. mandible and hardware (pan-sensitive)  -Blood Cx NGTD 6/1     Plan:  -c/w IV unasyn 3g q6h; stop date 7/15/22  -c/w IV fluconazole 400g q24hrs ; stop date 7/15/22  -Weekly CMP, CBC + diff, ESR, CRP; fax to [phone], Attn: Dr. Darrion Obando  -Schedule follow-up appointment with Dr. Darrion Obando  -will transition to appropriate oral treatment for actinomyces upon completion of IV treatment  -will need long term suppressive therapy for candida d/t hardware             PAST MEDICAL HISTORY:  Past Medical History:   Diagnosis Date    Personal history of malignant neoplasm of other organs and systems     History of squamous cell carcinoma    Personal history of malignant neoplasm, unspecified 01/30/2015    History of malignant neoplasm    Personal history of other diseases of the nervous system and sense organs      History of sleep apnea    Personal history of other diseases of the respiratory system     Personal history of asthma    Snoring     Snoring    Squamous cell carcinoma of skin of scalp and neck 01/30/2015    SCC (squamous cell carcinoma), scalp/neck       PAST SURGICAL HISTORY:  Past Surgical History:   Procedure Laterality Date    APPENDECTOMY  10/28/2013    Appendectomy    KNEE SURGERY  10/28/2013    Knee Surgery Right    TONSILLECTOMY  10/28/2013    Tonsillectomy       ALLERGIES:    No Known Allergies    MEDICATIONS:      Current Outpatient Medications:     alpha lipoic acid 600 mg capsule, Take 1 capsule by mouth once daily., Disp: , Rfl:     biotin 1 mg tablet, Take 1 tablet (1,000 mcg) by mouth once daily., Disp: , Rfl:     calcium carbonate/vitamin D3 (CALCIUM 500 + D ORAL), Take by mouth once daily., Disp: , Rfl:     donepezil (Aricept) 5 mg tablet, Take 1 tablet (5 mg) by mouth once daily at bedtime., Disp: , Rfl:     doxycycline (Vibramycin) 100 mg capsule, Take 1 capsule (100 mg) by mouth 2 times a day., Disp: , Rfl:     ferrous sulfate, 325 mg ferrous sulfate, tablet, Take 1 tablet by mouth once daily., Disp: , Rfl:     fluconazole (Diflucan) 200 mg tablet, TAKE 1 TABLET BY MOUTH ONCE  DAILY, Disp: 90 tablet, Rfl: 1    Namenda XR 28 mg capsule,sprinkle,ER 24hr, Take 1 capsule (28 mg) by mouth once daily., Disp: , Rfl:     potassium chloride ER (Micro-K) 10 mEq ER capsule, Take 1 capsule (10 mEq) by mouth 2 times daily (morning and late afternoon)., Disp: , Rfl:     tamsulosin (Flomax) 0.4 mg 24 hr capsule, Take 1 capsule (0.4 mg) by mouth once daily., Disp: , Rfl:     triamcinolone (Kenalog) 0.1 % cream, Apply topically 2 times a day., Disp: , Rfl:     Xarelto 20 mg tablet, Take 1 tablet (20 mg) by mouth once daily., Disp: , Rfl:     REVIEW OF SYSTEMS:  Review of Systems   Constitutional: Negative.    HENT: Negative.     Eyes: Negative.    Respiratory: Negative.     Cardiovascular: Negative.     Gastrointestinal: Negative.    Genitourinary: Negative.    Musculoskeletal: Negative.    Skin: Negative.    Allergic/Immunologic: Negative.    Neurological: Negative.    Hematological: Negative.    Psychiatric/Behavioral: Negative.     All other systems reviewed and are negative.      PHYSICAL EXAMINATION:       Visit Vitals  /63 (BP Location: Right arm, Patient Position: Sitting, BP Cuff Size: Adult)   Pulse 81   Temp 36.8 °C (98.2 °F) (Oral)   Ht 1.524 m (5')   Wt 91.2 kg (201 lb)   BMI 39.26 kg/m²   Smoking Status Never   BSA 1.96 m²        EXAM:   Physical Exam  Vitals reviewed.   Constitutional:       Appearance: Normal appearance.   HENT:      Head: Normocephalic.      Nose: Nose normal.   Eyes:      Extraocular Movements: Extraocular movements intact.      Pupils: Pupils are equal, round, and reactive to light.   Cardiovascular:      Rate and Rhythm: Normal rate and regular rhythm.   Pulmonary:      Effort: Pulmonary effort is normal.   Abdominal:      General: Abdomen is flat. Bowel sounds are normal.   Musculoskeletal:         General: Normal range of motion.      Cervical back: Normal range of motion.   Skin:     General: Skin is warm.   Neurological:      General: No focal deficit present.      Mental Status: He is alert.   Psychiatric:         Mood and Affect: Mood normal.         Behavior: Behavior normal.         Thought Content: Thought content normal.         Judgment: Judgment normal.         DATA:    Microbiology:   No results found for the last 90 days.      ASSESSMENT / RECOMMENDATIONS:  Mr. Kendrick is a 75 y/o/M with a PMH notable for ORN, R. mandible SCC resection + radiation in 2012, and debridement in April 2022 s/p R. mandible reconstruction with titanium hardware and R. forearm osteocutaneous graft and s/p open reduction and internal fixation of R. 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 mandible and hardware cultured on 5/31. Pt is HDS, afebrile, and his leukocytosis has resolved     MICRO:  -actinomyces odontolyticus R. mandible on 5/25; sensitive to CTX,clinda, pencillin, and vanc; sensitive to tetracyclines  - +3 aerobes on 5/25 and 5/31 in R. mandible   - C. albicans on 5/31 in R. mandible and hardware (pan-sensitive)  -Blood Cx NGTD 6/1     IMPRESSIONS:  1. Cervicofacial actinomycosis  2. Mandibular OM (actino + candidal)     CURRENT (11/30/2023):  Doing well on oral ABX  Plan is to continue ABXs indefinitely given underlying hardware and risk of recurrent infection    UPDATE 6/20/2024  Did well on oral ABXs  Reviewed labs, WNL     PLAN:  1.Continue doxycycline 100 mg PO BID  2. Continue fluconazole 200 mg PO daily  3. RTC in 6 mo to reassess     Pt and wife are in agreement with plan, state that feel satisfied all their questions were adequately answered.     I spent 20 minutes in the professional and overall care of this patient.       Darrion Obando MD MPH      I spent 30 minutes in the professional and overall care of this patient.      Darrion Obando MD MPH

## 2024-06-20 NOTE — LETTER
06/21/24    Nawaf Miranda DO  [address]  [facility]  [facility] [address]      Dear Dr. Nawaf Miranda DO,    I am writing to confirm that your patient, Giovanny Kendrick, received care in my office on 06/21/24. I have enclosed a summary of the care provided to Giovanny for your reference.    Please contact me with any questions you may have regarding the visit.    Sincerely,         Darrion Obando MD MPH  [address] [facility]  [facility] [address]  [facility] [address]    CC: No Recipients

## 2024-08-07 DIAGNOSIS — A42.9 ACTINOMYCOSIS: ICD-10-CM

## 2024-08-07 DIAGNOSIS — M27.2 OSTEOMYELITIS OF MANDIBLE: ICD-10-CM

## 2024-08-08 RX ORDER — DOXYCYCLINE HYCLATE 100 MG
100 TABLET ORAL 2 TIMES DAILY
Qty: 180 TABLET | Refills: 1 | Status: SHIPPED | OUTPATIENT
Start: 2024-08-08

## 2024-12-19 ENCOUNTER — APPOINTMENT (OUTPATIENT)
Dept: INFECTIOUS DISEASES | Facility: CLINIC | Age: 79
End: 2024-12-19
Payer: MEDICARE

## 2024-12-19 DIAGNOSIS — M87.9: Primary | ICD-10-CM

## 2024-12-19 PROCEDURE — 99214 OFFICE O/P EST MOD 30 MIN: CPT | Performed by: INTERNAL MEDICINE

## 2024-12-19 PROCEDURE — 1157F ADVNC CARE PLAN IN RCRD: CPT | Performed by: INTERNAL MEDICINE

## 2024-12-19 PROCEDURE — G2211 COMPLEX E/M VISIT ADD ON: HCPCS | Performed by: INTERNAL MEDICINE

## 2024-12-19 ASSESSMENT — ENCOUNTER SYMPTOMS
HEMATOLOGIC/LYMPHATIC NEGATIVE: 1
ALLERGIC/IMMUNOLOGIC NEGATIVE: 1
PSYCHIATRIC NEGATIVE: 1
MUSCULOSKELETAL NEGATIVE: 1
EYES NEGATIVE: 1
RESPIRATORY NEGATIVE: 1
CONSTITUTIONAL NEGATIVE: 1
GASTROINTESTINAL NEGATIVE: 1
NEUROLOGICAL NEGATIVE: 1
CARDIOVASCULAR NEGATIVE: 1

## 2024-12-19 NOTE — PROGRESS NOTES
Infectious Diseases Clinic Follow-up:    Reason for Visit: No chief complaint on file.      History of Current Issue  Giovanny Kendrick is a 79 y.o. year old male      Telephone follow up visit.     I first saw pt in 6/2022 for OM of mandible, full details as below.     EXCERPTS FROM LAST HOSPITAL ID NOTE     Mr. Kendrick is a 75 y/o/M with a PMH notable for ORN, R. mandible SCC resection + radiation in 2012, and debridement in April 2022 s/p R. mandible reconstruction with titanium hardware and R. forearm osteocutaneous graft and s/p open reduction and internal fixation of R. 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 mandible and hardware cultured on 5/31. Pt is HDS, afebrile, and his leukocytosis has resolved     Micro:  -actinomyces odontolyticus R. mandible on 5/25; sensitive to CTX,clinda, pencillin, and vanc; sensitive to tetracyclines  - +3 aerobes on 5/25 and 5/31 in R. mandible   - C. albicans on 5/31 in R. mandible and hardware (pan-sensitive)  -Blood Cx NGTD 6/1     Plan:  -c/w IV unasyn 3g q6h; stop date 7/15/22  -c/w IV fluconazole 400g q24hrs ; stop date 7/15/22  -Weekly CMP, CBC + diff, ESR, CRP; fax to [phone], Attn: Dr. Darrion Obando  -Schedule follow-up appointment with Dr. Darrion Obando  -will transition to appropriate oral treatment for actinomyces upon completion of IV treatment  -will need long term suppressive therapy for candida d/t hardware             PAST MEDICAL HISTORY:  Past Medical History:   Diagnosis Date    Personal history of malignant neoplasm of other organs and systems     History of squamous cell carcinoma    Personal history of malignant neoplasm, unspecified 01/30/2015    History of malignant neoplasm    Personal history of other diseases of the nervous system and sense organs     History of sleep apnea    Personal history of other diseases of the respiratory  system     Personal history of asthma    Snoring     Snoring    Squamous cell carcinoma of skin of scalp and neck 01/30/2015    SCC (squamous cell carcinoma), scalp/neck       PAST SURGICAL HISTORY:  Past Surgical History:   Procedure Laterality Date    APPENDECTOMY  10/28/2013    Appendectomy    KNEE SURGERY  10/28/2013    Knee Surgery Right    TONSILLECTOMY  10/28/2013    Tonsillectomy       ALLERGIES:    No Known Allergies    MEDICATIONS:      Current Outpatient Medications:     alpha lipoic acid 600 mg capsule, Take 1 capsule by mouth once daily., Disp: , Rfl:     biotin 1 mg tablet, Take 1 tablet (1,000 mcg) by mouth once daily., Disp: , Rfl:     calcium carbonate/vitamin D3 (CALCIUM 500 + D ORAL), Take by mouth once daily., Disp: , Rfl:     donepezil (Aricept) 5 mg tablet, Take 1 tablet (5 mg) by mouth once daily at bedtime., Disp: , Rfl:     doxycycline (Vibra-Tabs) 100 mg tablet, TAKE 1 TABLET BY MOUTH TWICE  DAILY, Disp: 180 tablet, Rfl: 1    doxycycline (Vibramycin) 100 mg capsule, Take 1 capsule (100 mg) by mouth 2 times a day., Disp: , Rfl:     ferrous sulfate, 325 mg ferrous sulfate, tablet, Take 1 tablet by mouth once daily., Disp: , Rfl:     fluconazole (Diflucan) 200 mg tablet, TAKE 1 TABLET BY MOUTH ONCE  DAILY, Disp: 90 tablet, Rfl: 1    Namenda XR 28 mg capsule,sprinkle,ER 24hr, Take 1 capsule (28 mg) by mouth once daily., Disp: , Rfl:     potassium chloride ER (Micro-K) 10 mEq ER capsule, Take 1 capsule (10 mEq) by mouth 2 times daily (morning and late afternoon)., Disp: , Rfl:     tamsulosin (Flomax) 0.4 mg 24 hr capsule, Take 1 capsule (0.4 mg) by mouth once daily., Disp: , Rfl:     triamcinolone (Kenalog) 0.1 % cream, Apply topically 2 times a day., Disp: , Rfl:     Xarelto 20 mg tablet, Take 1 tablet (20 mg) by mouth once daily., Disp: , Rfl:     REVIEW OF SYSTEMS:    Review of Systems   Constitutional: Negative.    HENT: Negative.     Eyes: Negative.    Respiratory: Negative.      Cardiovascular: Negative.    Gastrointestinal: Negative.    Genitourinary: Negative.    Musculoskeletal: Negative.    Skin: Negative.    Allergic/Immunologic: Negative.    Neurological: Negative.    Hematological: Negative.    Psychiatric/Behavioral: Negative.     All other systems reviewed and are negative.      PHYSICAL EXAMINATION:       Visit Vitals  Smoking Status Never        EXAM:   N/A      DATA:    Microbiology:        ASSESSMENT / RECOMMENDATIONS:  Mr. Kendrick is a 77 y/o/M with a PMH notable for ORN, R. mandible SCC resection + radiation in 2012, and debridement in April 2022 s/p R. mandible reconstruction with titanium hardware and R. forearm osteocutaneous graft and s/p open reduction and internal fixation of R. 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 mandible and hardware cultured on 5/31. Pt is HDS, afebrile, and his leukocytosis has resolved     MICRO:  -actinomyces odontolyticus R. mandible on 5/25; sensitive to CTX,clinda, pencillin, and vanc; sensitive to tetracyclines  - +3 aerobes on 5/25 and 5/31 in R. mandible   - C. albicans on 5/31 in R. mandible and hardware (pan-sensitive)  -Blood Cx NGTD 6/1     IMPRESSIONS:  1. Cervicofacial actinomycosis  2. Mandibular OM (actino + candidal)     CURRENT (11/30/2023):  Doing well on oral ABX  Plan is to continue ABXs indefinitely given underlying hardware and risk of recurrent infection     UPDATE 6/20/2024  Did well on oral ABXs  Reviewed labs, WNL     UPDATE 12/19/2024  Spoke with pt and wife. Doing well on ABXs, denies any issues    PLAN:  1.Continue doxycycline 100 mg PO BID  2. Continue fluconazole 200 mg PO daily  3. RTC in 6 mo to reassess     Pt and wife are in agreement with plan, state that feel satisfied all their questions were adequately answered.     I spent 20 minutes in the professional and overall care of this patient.          Darrion Obando MD MPH

## 2025-05-27 ENCOUNTER — TELEPHONE (OUTPATIENT)
Dept: INFECTIOUS DISEASES | Facility: HOSPITAL | Age: 80
End: 2025-05-27
Payer: MEDICARE

## 2025-05-27 DIAGNOSIS — A42.9 ACTINOMYCOSIS: ICD-10-CM

## 2025-05-27 DIAGNOSIS — M27.2 OSTEOMYELITIS OF MANDIBLE: ICD-10-CM

## 2025-05-27 RX ORDER — DOXYCYCLINE HYCLATE 100 MG
100 TABLET ORAL 2 TIMES DAILY
Qty: 180 TABLET | Refills: 1 | Status: SHIPPED | OUTPATIENT
Start: 2025-05-27

## 2025-05-27 NOTE — TELEPHONE ENCOUNTER
[facility] patient of terrence: Patient is requesting a refill of doxycycline to be called in to [facility] pharmacy on file in chart.

## 2025-05-30 DIAGNOSIS — M27.2 OSTEOMYELITIS OF MANDIBLE: ICD-10-CM

## 2025-05-30 DIAGNOSIS — A42.9 ACTINOMYCOSIS: ICD-10-CM

## 2025-05-30 RX ORDER — FLUCONAZOLE 200 MG/1
200 TABLET ORAL DAILY
Qty: 90 TABLET | Refills: 1 | Status: SHIPPED | OUTPATIENT
Start: 2025-05-30

## 2025-06-06 ENCOUNTER — APPOINTMENT (OUTPATIENT)
Facility: CLINIC | Age: 80
End: 2025-06-06
Payer: MEDICARE

## 2025-06-06 VITALS — WEIGHT: 201 LBS | BODY MASS INDEX: 39.26 KG/M2

## 2025-06-06 DIAGNOSIS — C06.9 ORAL CANCER (MULTI): ICD-10-CM

## 2025-06-06 DIAGNOSIS — M87.9: Primary | ICD-10-CM

## 2025-06-06 PROCEDURE — 1160F RVW MEDS BY RX/DR IN RCRD: CPT | Performed by: OTOLARYNGOLOGY

## 2025-06-06 PROCEDURE — 99213 OFFICE O/P EST LOW 20 MIN: CPT | Performed by: OTOLARYNGOLOGY

## 2025-06-06 PROCEDURE — 1157F ADVNC CARE PLAN IN RCRD: CPT | Performed by: OTOLARYNGOLOGY

## 2025-06-06 PROCEDURE — 1036F TOBACCO NON-USER: CPT | Performed by: OTOLARYNGOLOGY

## 2025-06-06 PROCEDURE — 1159F MED LIST DOCD IN RCRD: CPT | Performed by: OTOLARYNGOLOGY

## 2025-06-06 ASSESSMENT — PATIENT HEALTH QUESTIONNAIRE - PHQ9
1. LITTLE INTEREST OR PLEASURE IN DOING THINGS: NOT AT ALL
2. FEELING DOWN, DEPRESSED OR HOPELESS: NOT AT ALL
SUM OF ALL RESPONSES TO PHQ9 QUESTIONS 1 AND 2: 0

## 2025-06-06 NOTE — PROGRESS NOTES
ENT Follow up Visit    History Of Present Illness  Giovanny Kendrick is a 79 y.o. male presents for follow-up     2012 right maxillectomy and RFF for SCCa, completed XRT in 2012     TSH: with PCP  Chest Imagin2023 no progression of nodules     hx of ORN of the right maxilla and left mandible. Loose bone was found at right maxilla and left mandible both of which were easily closed. He then developed a right mandible fracture related to ORN and is now s/p right segmental mandibulectomy and flap reconstruction with a right ocrff.      2025: Patient returns for follow-up.  He is overall doing well.  He is asymptomatic and denies any pain or new concerns.  Past Medical History  He has a past medical history of Personal history of malignant neoplasm of other organs and systems, Personal history of malignant neoplasm, unspecified (2015), Personal history of other diseases of the nervous system and sense organs, Personal history of other diseases of the respiratory system, Snoring, and Squamous cell carcinoma of skin of scalp and neck (2015).    Surgical History  He has a past surgical history that includes Tonsillectomy (10/28/2013); Appendectomy (10/28/2013); and Knee surgery (10/28/2013).     Social History  He reports that he has never smoked. He has never used smokeless tobacco. He reports that he does not drink alcohol and does not use drugs.    Family History  No family history on file.     Allergies  Patient has no known allergies.     Physical Exam:  Well appearing individual in no acute distress. No stertor and no Stridor. Good voice. No LAD. Skin is soft and supple. Oral cavity and oropharynx are without mucosal lesions or exposed bone. all surgical sites well healed.  Possible fracture line in the hardware. no thyromegaly. Anterior rhinoscopy reveals normal nasal mucosa bilaterally. EAC normal AU with KATHLEEN. Cranial Nerves grossly intact.          Last Recorded Vitals  There were no  vitals taken for this visit.      Assessment and Plan  79 y.o. male s/p 04/2012 RFFF after right maxillectomy for SCCa, SHEILA. Recently had debridement of right maxilla and left mandible due to ORN. Then had pathologic fracture of right mandible due to ORN and underwent mandibulectomy and flap recon.  Had some postoperative imaging that showed possible fracture of the plate however he has remained asymptomatic     -Continue follow-up with infectious disease for antibiotic management  - Advised patient that I was leaving  and he can reestablish care with new provider.  Gave him contact information for scheduling follow-up with Dr. Braswell.     Silver Cunningham MD

## (undated) DEVICE — SPONGE,PEANUT,XRAY,ST,SM,3/8",5/CARD: Brand: MEDLINE INDUSTRIES, INC.

## (undated) DEVICE — SKIN PREP TRAY W/CHG: Brand: MEDLINE INDUSTRIES, INC.

## (undated) DEVICE — BLADE,CARBON-STEEL,15,STRL,DISPOSABLE,TB: Brand: MEDLINE

## (undated) DEVICE — SUTURE VCRL SZ 4-0 L27IN ABSRB UD L17MM RB-1 1/2 CIR J214H

## (undated) DEVICE — STAZ MAJOR BASIN: Brand: MEDLINE INDUSTRIES, INC.

## (undated) DEVICE — GOWN,AURORA,NONREINFORCED,LARGE: Brand: MEDLINE

## (undated) DEVICE — CORD,CAUTERY,BIPOLAR,STERILE: Brand: MEDLINE

## (undated) DEVICE — GLOVE ORTHO 8   MSG9480

## (undated) DEVICE — NEEDLE HYPO 25GA L1.5IN BLU POLYPR HUB S STL REG BVL STR

## (undated) DEVICE — SUTURE PERMA-HAND SZ 2-0 L30IN NONABSORBABLE BLK L26MM SH K833H

## (undated) DEVICE — DRAIN SURG PENROSE 0.25X18 IN CLOSED WND DRAINAGE PREM SIL

## (undated) DEVICE — SUTURE VCRL + SZ 2-0 L27IN ABSRB WHT SH 1/2 CIR TAPERCUT VCP417H

## (undated) DEVICE — STRAP,CATHETER,ELASTIC,HOOK&LOOP: Brand: MEDLINE

## (undated) DEVICE — CATHETER,URETHRAL,REDRUBBER,STRL,14FR: Brand: MEDLINE INDUSTRIES, INC.

## (undated) DEVICE — DRAINBAG,ANTI-REFLUX TOWER,L/F,2000ML,LL: Brand: MEDLINE

## (undated) DEVICE — TOWEL,OR,DSP,ST,BLUE,DLX,XR,4/PK,20PK/CS: Brand: MEDLINE

## (undated) DEVICE — DRAPE,REIN 53X77,STERILE: Brand: MEDLINE

## (undated) DEVICE — STERILE SURGICAL LUBRICANT, METAL TUBE: Brand: SURGILUBE

## (undated) DEVICE — SUTURE CHROMIC GUT SZ 0 L27IN ABSRB BRN SH L26MM 1/2 CIR G124H

## (undated) DEVICE — SUTURE PROL SZ 2-0 L30IN NONABSORBABLE BLU L26MM SH 1/2 CIR 8833H

## (undated) DEVICE — SYRINGE, LUER LOCK, 10ML: Brand: MEDLINE

## (undated) DEVICE — SUTURE PERMAHAND SZ 2-0 L30IN NONABSORBABLE BLK SH L26MM C016D

## (undated) DEVICE — RAKE HOOKS LONE STAR RETRCT SYS BLNT END SM CRV 4-FINGER E

## (undated) DEVICE — LIQUIBAND RAPID ADHESIVE 36/CS 0.8ML: Brand: MEDLINE

## (undated) DEVICE — CATHETER IV 14GA L1.25IN TEF STR HUB INTROCAN SFTY

## (undated) DEVICE — CONTAINER,SPECIMEN,OR STERILE,4OZ: Brand: MEDLINE

## (undated) DEVICE — CATHETER,URETHRAL,REDRUBBER,STRL,16FR: Brand: MEDLINE

## (undated) DEVICE — SUTURE PROL SZ 3-0 L30IN NONABSORBABLE BLU L26MM SH 1/2 CIR 8832H

## (undated) DEVICE — SWABSTICK MEDICATED 10% POVIDONE IOD PVP SGL ANTISEP SAT